# Patient Record
Sex: FEMALE | Race: WHITE | NOT HISPANIC OR LATINO | Employment: FULL TIME | URBAN - METROPOLITAN AREA
[De-identification: names, ages, dates, MRNs, and addresses within clinical notes are randomized per-mention and may not be internally consistent; named-entity substitution may affect disease eponyms.]

---

## 2020-05-13 ENCOUNTER — OFFICE VISIT (OUTPATIENT)
Dept: ORTHOPEDICS | Facility: CLINIC | Age: 40
End: 2020-05-13
Payer: COMMERCIAL

## 2020-05-13 ENCOUNTER — TELEPHONE (OUTPATIENT)
Dept: RADIOLOGY | Facility: CLINIC | Age: 40
End: 2020-05-13

## 2020-05-13 VITALS — DIASTOLIC BLOOD PRESSURE: 74 MMHG | WEIGHT: 154 LBS | SYSTOLIC BLOOD PRESSURE: 115 MMHG

## 2020-05-13 DIAGNOSIS — M51.16 LUMBAR DISC HERNIATION WITH RADICULOPATHY: Primary | ICD-10-CM

## 2020-05-13 DIAGNOSIS — M51.36 DISC DEGENERATION, LUMBAR: ICD-10-CM

## 2020-05-13 PROCEDURE — 99203 PR OFFICE/OUTPT VISIT, NEW, LEVL III, 30-44 MIN: ICD-10-PCS | Mod: S$GLB,,, | Performed by: ORTHOPAEDIC SURGERY

## 2020-05-13 PROCEDURE — 99203 OFFICE O/P NEW LOW 30 MIN: CPT | Mod: S$GLB,,, | Performed by: ORTHOPAEDIC SURGERY

## 2020-05-13 RX ORDER — GABAPENTIN 300 MG/1
CAPSULE ORAL
COMMUNITY
Start: 2020-04-27 | End: 2021-10-22

## 2020-05-13 RX ORDER — MELOXICAM 15 MG/1
15 TABLET ORAL DAILY
COMMUNITY
Start: 2020-04-27 | End: 2021-10-22

## 2020-05-13 NOTE — PROGRESS NOTES
Subjective:       Patient ID: Lucy Bland is a 40 y.o. female.    Chief Complaint: Pain of the Lumbar Spine (Lumbar pain x years that radiates down both legs with right being worse to her feet with numbness and tingling. Limited sitting, standing and walking. MRI done 3-2020 at [facility])    This for  History of Present Illness  This 40-year-old lady comes in today with complaints of low back pain that radiates down both legs the right leg much greater.  She saw Dr. smart who schedule an epidural steroid injection however this was canceled due to the COVID epididymis.  She complains of severe pain predominantly in the right side leg pain is greater than back pain and she has no bowel or bladder incontinence    Current Medications  Current Outpatient Medications   Medication Sig Dispense Refill    gabapentin (NEURONTIN) 300 MG capsule TAKE 1 CAPSULE BY MOUTH EVERYDAY AT BEDTIME      meloxicam (MOBIC) 15 MG tablet Take 15 mg by mouth once daily.       No current facility-administered medications for this visit.        Allergies  Review of patient's allergies indicates:  No Known Allergies    Past Medical History  History reviewed. No pertinent past medical history.    Surgical History  Past Surgical History:   Procedure Laterality Date    c section         Family History:   History reviewed. No pertinent family history.    Social History:   Social History     Socioeconomic History    Marital status: Unknown     Spouse name: Not on file    Number of children: Not on file    Years of education: Not on file    Highest education level: Not on file   Occupational History    Not on file   Social Needs    Financial resource strain: Not on file    Food insecurity:     Worry: Not on file     Inability: Not on file    Transportation needs:     Medical: Not on file     Non-medical: Not on file   Tobacco Use    Smoking status: Never Smoker    Smokeless tobacco: Never Used   Substance and Sexual Activity     Alcohol use: Not on file    Drug use: Not on file    Sexual activity: Not on file   Lifestyle    Physical activity:     Days per week: Not on file     Minutes per session: Not on file    Stress: Not on file   Relationships    Social connections:     Talks on phone: Not on file     Gets together: Not on file     Attends [employer] service: Not on file     Active member of club or organization: Not on file     Attends meetings of clubs or organizations: Not on file     Relationship status: Not on file   Other Topics Concern    Not on file   Social History Narrative    Not on file       Hospitalization/Major Diagnostic Procedure:     Review of Systems     General/Constitutional:  Chills denies. Fatigue denies. Fever denies. Weight gain denies. Weight loss denies.    Respiratory:  Shortness of breath denies.    Cardiovascular:  Chest pain denies.    Gastrointestinal:  Constipation denies. Diarrhea denies. Nausea denies. Vomiting denies.     Hematology:  Easy bruising denies. Prolonged bleeding denies.     Genitourinary:  Frequent urination denies. Pain in lower back denies. Painful urination denies.     Musculoskeletal:  See HPI for details    Skin:  Rash denies.    Neurologic:  Dizziness denies. Gait abnormalities denies. Seizures denies. Tingling/Numbess denies.    Psychiatric:  Anxiety denies. Depressed mood denies.     Objective:   Vital Signs:   Vitals:    05/13/20 1043   BP: 115/74        Physical Exam      General Examination:     Constitutional: The patient is alert and oriented to lace person and time. Mood is pleasant.     Head/Face: Normal facial features normal eyebrows    Eyes: Normal extraocular motion bilaterally    Lungs: Respirations are equal and unlabored    Gait is coordinated.    Cardiovascular: There are no swelling or varicosities present.    Lymphatic: Negative for adenopathy    Skin: Normal    Neurological: Level of consciousness normal. Oriented to place person and time and  situation    Psychiatric: Oriented to time place person and situation    Patient stand erect has pain when doing so has tenderness of mild spasm on the right side straight leg raising is positive on the right side subjective numbness noted in L4 nerve root distribution on the right side motor exam is intact    XRAY Report/ Interpretation :  MRI study lumbar spine was personally reviewed patient has large right-sided disc herniation at the L4-5 level      Assessment:       1. Lumbar disc herniation with radiculopathy    2. Disc degeneration, lumbar        Plan:       Lucy was seen today for pain.    Diagnoses and all orders for this visit:    Lumbar disc herniation with radiculopathy  Comments:  L4-5  Orders:  -     Ambulatory referral/consult to Pain Clinic; Future    Disc degeneration, lumbar  Comments:  L4-5  Orders:  -     Ambulatory referral/consult to Pain Clinic; Future    Other orders  -     Cancel: X-Ray Lumbar Spine Ap And Lateral  -     Cancel: X-Ray Pelvis Routine AP         Follow up in about 3 weeks (around 6/3/2020).    Treatment options were discussed regards to the nature of the spinal condition conservative pain interventional and surgical options were discussed in detail and the probability of success of the separate treatment options was discussed in detail the patient expressed a clear understanding of the treatment options would regards to surgery the procedure risks benefits complications and outcomes were discussed.  No guarantees were given regards to surgical outcome.  We referring her to Dr. jorge to have an epidural steroid injection he will see her and do the injection body into the week this is the earliest 2nd get her in to see a pain management doctor for the injection should this fail she would be a candidate to consider surgery.    This note was created using Dragon voice recognition software that occasionally misinterpreted phrases or words.

## 2020-11-20 DIAGNOSIS — M54.50 LUMBAGO: Primary | ICD-10-CM

## 2020-11-30 ENCOUNTER — HOSPITAL ENCOUNTER (OUTPATIENT)
Dept: RADIOLOGY | Facility: HOSPITAL | Age: 40
Discharge: HOME | End: 2020-11-30
Attending: ANESTHESIOLOGY
Payer: COMMERCIAL

## 2020-11-30 DIAGNOSIS — M54.50 LUMBAGO: ICD-10-CM

## 2020-11-30 PROCEDURE — 72100 X-RAY EXAM L-S SPINE 2/3 VWS: CPT | Mod: TC,PO
